# Patient Record
Sex: FEMALE | URBAN - NONMETROPOLITAN AREA
[De-identification: names, ages, dates, MRNs, and addresses within clinical notes are randomized per-mention and may not be internally consistent; named-entity substitution may affect disease eponyms.]

---

## 2021-11-11 ENCOUNTER — APPOINTMENT (OUTPATIENT)
Age: 71
Setting detail: DERMATOLOGY
End: 2021-11-12

## 2021-11-11 VITALS — WEIGHT: 288 LBS | HEIGHT: 64 IN

## 2021-11-11 DIAGNOSIS — L91.8 OTHER HYPERTROPHIC DISORDERS OF THE SKIN: ICD-10-CM

## 2021-11-11 DIAGNOSIS — L738 OTHER SPECIFIED DISEASES OF HAIR AND HAIR FOLLICLES: ICD-10-CM

## 2021-11-11 DIAGNOSIS — L82.0 INFLAMED SEBORRHEIC KERATOSIS: ICD-10-CM

## 2021-11-11 DIAGNOSIS — L663 OTHER SPECIFIED DISEASES OF HAIR AND HAIR FOLLICLES: ICD-10-CM

## 2021-11-11 PROBLEM — L02.224 FURUNCLE OF GROIN: Status: ACTIVE | Noted: 2021-11-11

## 2021-11-11 PROBLEM — L02.426 FURUNCLE OF LEFT LOWER LIMB: Status: ACTIVE | Noted: 2021-11-11

## 2021-11-11 PROBLEM — L02.221 FURUNCLE OF ABDOMINAL WALL: Status: ACTIVE | Noted: 2021-11-11

## 2021-11-11 PROCEDURE — OTHER REASSURANCE: OTHER

## 2021-11-11 PROCEDURE — OTHER COUNSELING: OTHER

## 2021-11-11 PROCEDURE — 17110 DESTRUCT B9 LESION 1-14: CPT

## 2021-11-11 PROCEDURE — OTHER LIQUID NITROGEN: OTHER

## 2021-11-11 PROCEDURE — 99203 OFFICE O/P NEW LOW 30 MIN: CPT | Mod: 25

## 2021-11-11 PROCEDURE — OTHER PRESCRIPTION: OTHER

## 2021-11-11 PROCEDURE — OTHER ADDITIONAL NOTES: OTHER

## 2021-11-11 PROCEDURE — OTHER MIPS QUALITY: OTHER

## 2021-11-11 RX ORDER — BENZOYL PEROXIDE 100 MG/G
10% EMULSION TOPICAL QD
Qty: 227 | Refills: 3 | Status: ERX | COMMUNITY
Start: 2021-11-11

## 2021-11-11 RX ORDER — CEPHALEXIN 250 MG/1
1 CAP CAPSULE ORAL QD
Qty: 30 | Refills: 3 | Status: ERX | COMMUNITY
Start: 2021-11-11

## 2021-11-11 ASSESSMENT — LOCATION SIMPLE DESCRIPTION DERM
LOCATION SIMPLE: CHEST
LOCATION SIMPLE: LEFT THIGH
LOCATION SIMPLE: RIGHT THIGH
LOCATION SIMPLE: GENITALIA
LOCATION SIMPLE: GROIN
LOCATION SIMPLE: RIGHT HAND
LOCATION SIMPLE: LEFT ANTERIOR NECK

## 2021-11-11 ASSESSMENT — LOCATION DETAILED DESCRIPTION DERM
LOCATION DETAILED: MONS PUBIS
LOCATION DETAILED: MIDDLE STERNUM
LOCATION DETAILED: RIGHT RADIAL DORSAL HAND
LOCATION DETAILED: GENITALIA
LOCATION DETAILED: LEFT CLAVICULAR NECK
LOCATION DETAILED: RIGHT ANTERIOR PROXIMAL THIGH
LOCATION DETAILED: LEFT INFERIOR LATERAL NECK
LOCATION DETAILED: LEFT ANTERIOR PROXIMAL THIGH

## 2021-11-11 ASSESSMENT — LOCATION ZONE DERM
LOCATION ZONE: HAND
LOCATION ZONE: LEG
LOCATION ZONE: VULVA
LOCATION ZONE: TRUNK
LOCATION ZONE: NECK

## 2021-11-11 NOTE — PROCEDURE: MIPS QUALITY
Quality 130: Documentation Of Current Medications In The Medical Record: Current Medications Documented
Quality 226: Preventive Care And Screening: Tobacco Use: Screening And Cessation Intervention: Patient screened for tobacco use and is an ex/non-smoker
Quality 111:Pneumonia Vaccination Status For Older Adults: Pneumococcal Vaccination Previously Received
Detail Level: Detailed
Quality 110: Preventive Care And Screening: Influenza Immunization: Influenza Immunization not Administered because Patient Refused.

## 2021-11-11 NOTE — PROCEDURE: ADDITIONAL NOTES
Additional Notes: Pt had one skin tag removed at office visit.
Render Risk Assessment In Note?: no
Detail Level: Simple

## 2021-11-11 NOTE — PROCEDURE: LIQUID NITROGEN
Include Z78.9 (Other Specified Conditions Influencing Health Status) As An Associated Diagnosis?: No
Detail Level: Detailed
Medical Necessity Clause: This procedure was medically necessary because the lesions that were treated were:
Show Topical Anesthesia Variable?: Yes
Post-Care Instructions: I reviewed with the patient in detail post-care instructions. Patient is to wear sunprotection, and avoid picking at any of the treated lesions. Pt may apply Vaseline to crusted or scabbing areas.
Consent: The patient's consent was obtained including but not limited to risks of crusting, scabbing, blistering, scarring, darker or lighter pigmentary change, recurrence, incomplete removal and infection.
Medical Necessity Information: It is in your best interest to select a reason for this procedure from the list below. All of these items fulfill various CMS LCD requirements except the new and changing color options.

## 2021-12-09 ENCOUNTER — APPOINTMENT (OUTPATIENT)
Age: 71
Setting detail: DERMATOLOGY
End: 2021-12-10

## 2021-12-09 VITALS — HEIGHT: 64.5 IN | WEIGHT: 290 LBS

## 2021-12-09 DIAGNOSIS — L73.2 HIDRADENITIS SUPPURATIVA: ICD-10-CM

## 2021-12-09 DIAGNOSIS — L82.1 OTHER SEBORRHEIC KERATOSIS: ICD-10-CM

## 2021-12-09 DIAGNOSIS — L91.8 OTHER HYPERTROPHIC DISORDERS OF THE SKIN: ICD-10-CM

## 2021-12-09 DIAGNOSIS — B37.2 CANDIDIASIS OF SKIN AND NAIL: ICD-10-CM

## 2021-12-09 PROCEDURE — OTHER COUNSELING: OTHER

## 2021-12-09 PROCEDURE — OTHER PRESCRIPTION MEDICATION MANAGEMENT: OTHER

## 2021-12-09 PROCEDURE — 11307 SHAVE SKIN LESION 1.1-2.0 CM: CPT

## 2021-12-09 PROCEDURE — 99213 OFFICE O/P EST LOW 20 MIN: CPT | Mod: 25

## 2021-12-09 PROCEDURE — OTHER REASSURANCE: OTHER

## 2021-12-09 PROCEDURE — OTHER SHAVE REMOVAL: OTHER

## 2021-12-09 PROCEDURE — OTHER PRESCRIPTION: OTHER

## 2021-12-09 PROCEDURE — OTHER ADDITIONAL NOTES: OTHER

## 2021-12-09 RX ORDER — FLUCONAZOLE 150 MG/1
TABLET ORAL
Qty: 15 | Refills: 2 | Status: ERX | COMMUNITY
Start: 2021-12-09

## 2021-12-09 RX ORDER — NYSTATIN 100000 [USP'U]/G
AS DIRECTED POWDER TOPICAL BID
Qty: 60 | Refills: 3 | Status: ERX | COMMUNITY
Start: 2021-12-09

## 2021-12-09 ASSESSMENT — LOCATION SIMPLE DESCRIPTION DERM
LOCATION SIMPLE: LEFT FOREHEAD
LOCATION SIMPLE: LEFT SHOULDER
LOCATION SIMPLE: CHEST
LOCATION SIMPLE: LEFT UPPER BACK
LOCATION SIMPLE: LEFT THIGH
LOCATION SIMPLE: GROIN
LOCATION SIMPLE: LEFT UPPER BACK
LOCATION SIMPLE: LEFT CLAVICULAR SKIN
LOCATION SIMPLE: LEFT THIGH
LOCATION SIMPLE: RIGHT THIGH
LOCATION SIMPLE: LEFT FOREHEAD
LOCATION SIMPLE: LEFT SHOULDER
LOCATION SIMPLE: UPPER BACK
LOCATION SIMPLE: GROIN
LOCATION SIMPLE: ABDOMEN
LOCATION SIMPLE: CHEST
LOCATION SIMPLE: ABDOMEN
LOCATION SIMPLE: RIGHT THIGH
LOCATION SIMPLE: RIGHT CHEEK
LOCATION SIMPLE: RIGHT FOREHEAD
LOCATION SIMPLE: RIGHT FOREHEAD
LOCATION SIMPLE: RIGHT UPPER BACK
LOCATION SIMPLE: UPPER BACK

## 2021-12-09 ASSESSMENT — LOCATION ZONE DERM
LOCATION ZONE: TRUNK
LOCATION ZONE: ARM
LOCATION ZONE: FACE
LOCATION ZONE: TRUNK
LOCATION ZONE: FACE
LOCATION ZONE: VULVA
LOCATION ZONE: VULVA
LOCATION ZONE: LEG
LOCATION ZONE: ARM
LOCATION ZONE: LEG

## 2021-12-09 ASSESSMENT — LOCATION DETAILED DESCRIPTION DERM
LOCATION DETAILED: MONS PUBIS
LOCATION DETAILED: LEFT SUPERIOR UPPER BACK
LOCATION DETAILED: RIGHT LATERAL SUPERIOR CHEST
LOCATION DETAILED: RIGHT LATERAL SUPERIOR CHEST
LOCATION DETAILED: RIGHT INGUINAL CREASE
LOCATION DETAILED: EPIGASTRIC SKIN
LOCATION DETAILED: LEFT FOREHEAD
LOCATION DETAILED: MONS PUBIS
LOCATION DETAILED: EPIGASTRIC SKIN
LOCATION DETAILED: RIGHT LATERAL MALAR CHEEK
LOCATION DETAILED: LEFT SUPERIOR UPPER BACK
LOCATION DETAILED: LEFT RIB CAGE
LOCATION DETAILED: RIGHT ANTERIOR PROXIMAL THIGH
LOCATION DETAILED: LEFT CLAVICULAR SKIN
LOCATION DETAILED: RIGHT ANTERIOR PROXIMAL THIGH
LOCATION DETAILED: LEFT FOREHEAD
LOCATION DETAILED: SUPERIOR THORACIC SPINE
LOCATION DETAILED: LEFT ANTERIOR PROXIMAL THIGH
LOCATION DETAILED: RIGHT SUPERIOR FOREHEAD
LOCATION DETAILED: LEFT RIB CAGE
LOCATION DETAILED: LEFT SUPERIOR MEDIAL FOREHEAD
LOCATION DETAILED: LEFT POSTERIOR SHOULDER
LOCATION DETAILED: SUPERIOR THORACIC SPINE
LOCATION DETAILED: RIGHT INGUINAL CREASE
LOCATION DETAILED: RIGHT FOREHEAD
LOCATION DETAILED: RIGHT SUPERIOR UPPER BACK
LOCATION DETAILED: LEFT MEDIAL SUPERIOR CHEST
LOCATION DETAILED: LEFT SUPERIOR MEDIAL FOREHEAD
LOCATION DETAILED: LEFT POSTERIOR SHOULDER
LOCATION DETAILED: LEFT ANTERIOR PROXIMAL THIGH

## 2021-12-09 NOTE — PROCEDURE: ADDITIONAL NOTES
Detail Level: Simple
Additional Notes: Diflucan prescribed.  Severe drug interaction and contraindication noted with pt’s current medications and Diflucan.  Per pt she has been on Diflucan previously and did not have any prior reactions to medication and wishes to start again due to possible yeast infection to groin area.  Pt and provider agreeable to take Diflucan at this time.
Render Risk Assessment In Note?: no

## 2021-12-09 NOTE — HPI: BODY LOCATION - VULVA
How Severe Is Your Condition?: moderate
Additional History: Patient has been having issues with irritation in these areas. Patient states that Dr Feldman her PCP would like for us to perform a full body exam

## 2021-12-09 NOTE — PROCEDURE: PRESCRIPTION MEDICATION MANAGEMENT
Detail Level: Zone
Render In Strict Bullet Format?: No
Initiate Treatment: Diflucan 150mg po q72h and nystatin powder bid
Plan: Recommended white cotton material to place in inguinal folds for moisturizer absorption.
Continue Regimen: OTC wash (half hydrogen peroxide and half water) pt has at home.

## 2021-12-09 NOTE — PROCEDURE: SHAVE REMOVAL
Hemostasis: Drysol
Anesthesia Type: 1% lidocaine with epinephrine
Medical Necessity Information: It is in your best interest to select a reason for this procedure from the list below. All of these items fulfill various CMS LCD requirements except the new and changing color options.
Billing Type: Third-Party Bill
Wound Care: Petrolatum
Render Post-Care Instructions In Note?: no
Detail Level: Detailed
Biopsy Method: Dermablade
Notification Instructions: Patient will be notified of pathology results. However, patient instructed to call the office if not contacted within 2 weeks.
Consent was obtained from the patient. The risks and benefits to therapy were discussed in detail. Specifically, the risks of infection, scarring, bleeding, prolonged wound healing, incomplete removal, allergy to anesthesia, nerve injury and recurrence were addressed. Prior to the procedure, the treatment site was clearly identified and confirmed by the patient. All components of Universal Protocol/PAUSE Rule completed.
Post-Care Instructions: I reviewed with the patient in detail post-care instructions. Patient is to keep the biopsy site dry overnight, and then apply bacitracin twice daily until healed. Patient may apply hydrogen peroxide soaks to remove any crusting.
Medical Necessity Clause: This procedure was medically necessary because the lesion that was treated was:
Was A Bandage Applied: Yes
X Size Of Lesion In Cm (Optional): 0
Size Of Lesion In Cm (Required): 2

## 2022-03-02 ENCOUNTER — APPOINTMENT (OUTPATIENT)
Age: 72
Setting detail: DERMATOLOGY
End: 2022-03-03

## 2022-03-02 VITALS — HEIGHT: 64.5 IN | WEIGHT: 290 LBS

## 2022-03-02 DIAGNOSIS — B37.2 CANDIDIASIS OF SKIN AND NAIL: ICD-10-CM

## 2022-03-02 PROCEDURE — OTHER COUNSELING: OTHER

## 2022-03-02 PROCEDURE — OTHER PRESCRIPTION MEDICATION MANAGEMENT: OTHER

## 2022-03-02 PROCEDURE — OTHER PRESCRIPTION: OTHER

## 2022-03-02 PROCEDURE — 99213 OFFICE O/P EST LOW 20 MIN: CPT

## 2022-03-02 PROCEDURE — OTHER REASSURANCE: OTHER

## 2022-03-02 RX ORDER — NYSTATIN 100000 [USP'U]/G
AS DIRECTED POWDER TOPICAL BID
Qty: 60 | Refills: 3 | Status: ERX

## 2022-03-02 RX ORDER — FLUCONAZOLE 150 MG/1
TABLET ORAL
Qty: 15 | Refills: 1 | Status: ERX

## 2022-03-02 ASSESSMENT — LOCATION ZONE DERM
LOCATION ZONE: TRUNK
LOCATION ZONE: VULVA
LOCATION ZONE: LEG
LOCATION ZONE: LEG
LOCATION ZONE: VULVA

## 2022-03-02 ASSESSMENT — LOCATION SIMPLE DESCRIPTION DERM
LOCATION SIMPLE: GROIN
LOCATION SIMPLE: LEFT THIGH
LOCATION SIMPLE: GROIN
LOCATION SIMPLE: LEFT THIGH

## 2022-03-02 ASSESSMENT — LOCATION DETAILED DESCRIPTION DERM
LOCATION DETAILED: MONS PUBIS
LOCATION DETAILED: LEFT ANTERIOR PROXIMAL THIGH
LOCATION DETAILED: RIGHT SUPRAPUBIC SKIN
LOCATION DETAILED: RIGHT INGUINAL CREASE
LOCATION DETAILED: LEFT ANTERIOR PROXIMAL THIGH
LOCATION DETAILED: MONS PUBIS

## 2022-03-02 NOTE — PROCEDURE: PRESCRIPTION MEDICATION MANAGEMENT
Detail Level: Zone
Continue Regimen: Nystatin powder and Diflucan prn
Render In Strict Bullet Format?: No

## 2023-04-13 ENCOUNTER — APPOINTMENT (OUTPATIENT)
Dept: URBAN - NONMETROPOLITAN AREA SURGERY 10 | Age: 73
Setting detail: DERMATOLOGY
End: 2023-04-19

## 2023-04-13 DIAGNOSIS — Q82.5 CONGENITAL NON-NEOPLASTIC NEVUS: ICD-10-CM

## 2023-04-13 DIAGNOSIS — L81.4 OTHER MELANIN HYPERPIGMENTATION: ICD-10-CM

## 2023-04-13 DIAGNOSIS — L73.2 HIDRADENITIS SUPPURATIVA: ICD-10-CM

## 2023-04-13 DIAGNOSIS — L82.0 INFLAMED SEBORRHEIC KERATOSIS: ICD-10-CM

## 2023-04-13 DIAGNOSIS — L30.4 ERYTHEMA INTERTRIGO: ICD-10-CM

## 2023-04-13 PROCEDURE — OTHER COUNSELING: OTHER

## 2023-04-13 PROCEDURE — 17110 DESTRUCT B9 LESION 1-14: CPT

## 2023-04-13 PROCEDURE — 99212 OFFICE O/P EST SF 10 MIN: CPT | Mod: 25

## 2023-04-13 PROCEDURE — OTHER BENIGN DESTRUCTION: OTHER

## 2023-04-13 ASSESSMENT — LOCATION SIMPLE DESCRIPTION DERM
LOCATION SIMPLE: CHEST
LOCATION SIMPLE: GROIN
LOCATION SIMPLE: POSTERIOR SCALP
LOCATION SIMPLE: RIGHT FOREHEAD
LOCATION SIMPLE: LEFT UPPER BACK
LOCATION SIMPLE: LEFT SHOULDER
LOCATION SIMPLE: LEFT FOREARM
LOCATION SIMPLE: SCALP
LOCATION SIMPLE: LEFT CHEEK
LOCATION SIMPLE: RIGHT LIP
LOCATION SIMPLE: RIGHT FOREARM

## 2023-04-13 ASSESSMENT — LOCATION DETAILED DESCRIPTION DERM
LOCATION DETAILED: LEFT PROXIMAL DORSAL FOREARM
LOCATION DETAILED: RIGHT SUPERIOR FOREHEAD
LOCATION DETAILED: RIGHT MEDIAL SUPERIOR CHEST
LOCATION DETAILED: POSTERIOR MID-PARIETAL SCALP
LOCATION DETAILED: RIGHT UPPER CUTANEOUS LIP
LOCATION DETAILED: LEFT ANTERIOR SHOULDER
LOCATION DETAILED: LEFT SUPERIOR PARIETAL SCALP
LOCATION DETAILED: RIGHT PROXIMAL DORSAL FOREARM
LOCATION DETAILED: LEFT SUPRAPUBIC SKIN
LOCATION DETAILED: LEFT SUPERIOR MEDIAL UPPER BACK
LOCATION DETAILED: RIGHT SUPRAPUBIC SKIN
LOCATION DETAILED: LEFT INFERIOR CENTRAL MALAR CHEEK

## 2023-04-13 ASSESSMENT — LOCATION ZONE DERM
LOCATION ZONE: ARM
LOCATION ZONE: SCALP
LOCATION ZONE: FACE
LOCATION ZONE: LIP
LOCATION ZONE: TRUNK

## 2023-04-13 NOTE — HPI: INFECTION (CANDIDIASIS)
How Severe Is It?: moderate
Is This A New Presentation, Or A Follow-Up?: Follow Up Candidiasis
Additional History: Patient wants to have a refill nystain powder

## 2023-04-13 NOTE — PROCEDURE: BENIGN DESTRUCTION
Treatment Number (Will Not Render If 0): 0
Medical Necessity Information: It is in your best interest to select a reason for this procedure from the list below. All of these items fulfill various CMS LCD requirements except the new and changing color options.
Render Post-Care Instructions In Note?: no
Post-Care Instructions: I reviewed with the patient in detail post-care instructions. Patient is to wear sunprotection, and avoid picking at any of the treated lesions. Pt may apply Vaseline to crusted or scabbing areas.
Consent: The patient's consent was obtained including but not limited to risks of crusting, scabbing, blistering, scarring, darker or lighter pigmentary change, recurrence, incomplete removal and infection.
Medical Necessity Clause: This procedure was medically necessary because the lesions that were treated were:
Detail Level: Detailed
Anesthesia Volume In Cc: 0.5

## 2023-04-13 NOTE — HPI: RASH (HIDRADENITIS SUPPURATIVA)
Is This A New Presentation, Or A Follow-Up?: Follow Up Hidradenitis Suppurativa
Additional History: Patient states she has to wear adult diapers and it will irritate the lesions.

## 2023-05-23 ENCOUNTER — APPOINTMENT (OUTPATIENT)
Dept: URBAN - NONMETROPOLITAN AREA SURGERY 10 | Age: 73
Setting detail: DERMATOLOGY
End: 2023-05-23